# Patient Record
Sex: FEMALE | Race: WHITE | NOT HISPANIC OR LATINO | Employment: PART TIME | URBAN - METROPOLITAN AREA
[De-identification: names, ages, dates, MRNs, and addresses within clinical notes are randomized per-mention and may not be internally consistent; named-entity substitution may affect disease eponyms.]

---

## 2024-02-16 ENCOUNTER — OFFICE VISIT (OUTPATIENT)
Dept: ORTHOPEDIC SURGERY | Facility: CLINIC | Age: 79
End: 2024-02-16
Payer: MEDICARE

## 2024-02-16 DIAGNOSIS — M79.672 LEFT FOOT PAIN: ICD-10-CM

## 2024-02-16 DIAGNOSIS — M19.072 ARTHRITIS OF LEFT MIDFOOT: Primary | ICD-10-CM

## 2024-02-16 PROCEDURE — 20604 DRAIN/INJ JOINT/BURSA W/US: CPT | Performed by: EMERGENCY MEDICINE

## 2024-02-16 PROCEDURE — 99213 OFFICE O/P EST LOW 20 MIN: CPT | Performed by: EMERGENCY MEDICINE

## 2024-02-16 RX ORDER — TRIAMCINOLONE ACETONIDE 40 MG/ML
40 INJECTION, SUSPENSION INTRA-ARTICULAR; INTRAMUSCULAR
Status: COMPLETED | OUTPATIENT
Start: 2024-02-16 | End: 2024-02-16

## 2024-02-16 RX ORDER — LIDOCAINE HYDROCHLORIDE 10 MG/ML
0.5 INJECTION INFILTRATION; PERINEURAL
Status: COMPLETED | OUTPATIENT
Start: 2024-02-16 | End: 2024-02-16

## 2024-02-16 RX ADMIN — TRIAMCINOLONE ACETONIDE 40 MG: 40 INJECTION, SUSPENSION INTRA-ARTICULAR; INTRAMUSCULAR at 16:25

## 2024-02-16 RX ADMIN — LIDOCAINE HYDROCHLORIDE 0.5 ML: 10 INJECTION INFILTRATION; PERINEURAL at 16:25

## 2024-02-16 NOTE — PROGRESS NOTES
Subjective   Priyanka Leblanc is a 78 y.o. female who presents for Follow-up and Pain of the Left Foot    HPI    2/16/24: Patient returns today for reevaluation for left foot pain.  She has known left midfoot arthritis.  We did perform a second TMT joint ultrasound-guided corticosteroid injection on 4/6/2023.  She felt that this did work quite well for her.  However, her pain has since returned and she would like to have a repeat injection performed today.  No additional complaints.    4/6/23: Patient is a 77-year-old female presenting for evaluation of left foot pain. According to patient, pain has been ongoing for the last 1 to 2 years. She denies any trauma or injury prior to pain onset. She describes her pain as being localized to base of the first and second metatarsal. It is described as aching/stiff quality is exacerbated with weightbearing, and worse as the day goes on. She has tried Voltaren, Tylenol, and Salonpas with some relief. She has mild paresthesias at the tips of her toes but no localized numbness/tingling.          ROS: All pertinent positive symptoms are included in the history of present illness.    All other systems have been reviewed and are negative and noncontributory to this patient's current ailments.    Objective     There were no vitals filed for this visit.    Physical Exam  General/Constitutional: No apparent distress. Well-nourished and well developed.  Head: Normocephalic, Atraumatic.   Eyes: EOMI.  Vascular: No edema, swelling or tenderness, except as noted in detailed exam.  Respiratory: Non-labored breathing.  Integumentary: No impressive skin lesions present, except as noted in detailed exam.  Neurological: Alert and oriented.  Psychological:  Normal mood and affect.  Musculoskeletal: Normal, except as noted in detailed exam.  Musculoskeletal: Left foot-there is no deformity or asymmetry when compared to the opposite side. There is tenderness to palpation at the base of the 1st  and 2nd MT as well as medial cuneiform. She has FROM and strength is intact. She has pes planus of bilateral, left greater than right, foot . The remainder of the foot and ankle exam is unremarkable. He has an antalgic gait secondary to pain.       FOOT    - Impression -  1. Moderate midfoot degenerative changes involving the 2nd and 3rd  TMT joints and naviculocuneiform joint.  2. Mild pes planus.    Patient ID: Priyanka Leblanc is a 78 y.o. female.    S Inj/Asp on 2/16/2024 4:25 PM  Details: 25 G needle, ultrasound-guided medial approach  Medications: 40 mg triamcinolone acetonide 40 mg/mL; 0.5 mL lidocaine 10 mg/mL (1 %)  Outcome: tolerated well, no immediate complications  Procedure, treatment alternatives, risks and benefits explained, specific risks discussed. Consent was given by the patient. Immediately prior to procedure a time out was called to verify the correct patient, procedure, equipment, support staff and site/side marked as required. Patient was prepped and draped in the usual sterile fashion.           Assessment/Plan   Problem List Items Addressed This Visit    None  Visit Diagnoses       Arthritis of left midfoot    -  Primary    Left foot pain        Relevant Orders    Point of Care Ultrasound (Completed)          Considering that she did well with previous injection, I feel that a repeat injection is warranted.  Discussed risks versus benefits of having injection performed. Patient has elected to proceed with procedure. Please refer to procedure note above. Discussed with patient to limit weightbearing activities over the next 24-48 hours, they can then progress to full activities as tolerated. Discussed with patient to avoid water submersion over the next 2 days. Discussed with patient to call me immediately if they develop worsening pain, rash, erythema, or fevers. Patient should follow-up as needed if pain persists or worsens.  In the meantime, I did discuss with patient that she would likely  benefit from arch supports.  She will obtain these as well.    Paulie Olsen, DO  Sports Medicine  [facility], [facility] [place]    ** Please excuse any errors in grammar or translation related to this dictation. Voice recognition software was utilized to prepare this document. **

## 2024-03-11 ENCOUNTER — APPOINTMENT (OUTPATIENT)
Dept: ORTHOPEDIC SURGERY | Facility: HOSPITAL | Age: 79
End: 2024-03-11
Payer: MEDICARE

## 2024-04-22 ENCOUNTER — HOSPITAL ENCOUNTER (OUTPATIENT)
Dept: RADIOLOGY | Facility: CLINIC | Age: 79
Discharge: HOME | End: 2024-04-22
Payer: MEDICARE

## 2024-04-22 VITALS — WEIGHT: 156.09 LBS | BODY MASS INDEX: 26.65 KG/M2 | HEIGHT: 64 IN

## 2024-04-22 DIAGNOSIS — Z12.31 ENCOUNTER FOR SCREENING MAMMOGRAM FOR MALIGNANT NEOPLASM OF BREAST: ICD-10-CM

## 2024-04-22 PROCEDURE — 77067 SCR MAMMO BI INCL CAD: CPT | Performed by: RADIOLOGY

## 2024-04-22 PROCEDURE — 77067 SCR MAMMO BI INCL CAD: CPT

## 2024-04-22 PROCEDURE — 77063 BREAST TOMOSYNTHESIS BI: CPT | Performed by: RADIOLOGY

## 2024-09-12 ENCOUNTER — APPOINTMENT (OUTPATIENT)
Dept: GASTROENTEROLOGY | Facility: CLINIC | Age: 79
End: 2024-09-12
Payer: MEDICARE

## 2024-09-27 ENCOUNTER — OFFICE VISIT (OUTPATIENT)
Dept: GASTROENTEROLOGY | Facility: CLINIC | Age: 79
End: 2024-09-27
Payer: MEDICARE

## 2024-09-27 VITALS — HEART RATE: 76 BPM | BODY MASS INDEX: 25.27 KG/M2 | HEIGHT: 64 IN | WEIGHT: 148 LBS

## 2024-09-27 DIAGNOSIS — K21.9 GASTROESOPHAGEAL REFLUX DISEASE, UNSPECIFIED WHETHER ESOPHAGITIS PRESENT: Primary | ICD-10-CM

## 2024-09-27 DIAGNOSIS — K44.9 HIATAL HERNIA: ICD-10-CM

## 2024-09-27 PROCEDURE — 99204 OFFICE O/P NEW MOD 45 MIN: CPT | Performed by: INTERNAL MEDICINE

## 2024-09-27 PROCEDURE — 1036F TOBACCO NON-USER: CPT | Performed by: INTERNAL MEDICINE

## 2024-09-27 PROCEDURE — 1159F MED LIST DOCD IN RCRD: CPT | Performed by: INTERNAL MEDICINE

## 2024-09-27 PROCEDURE — 1160F RVW MEDS BY RX/DR IN RCRD: CPT | Performed by: INTERNAL MEDICINE

## 2024-09-27 RX ORDER — PANTOPRAZOLE SODIUM 40 MG/1
40 TABLET, DELAYED RELEASE ORAL
COMMUNITY
Start: 2024-09-12

## 2024-09-27 RX ORDER — MULTIVIT WITH MINERALS/HERBS
1 TABLET ORAL DAILY
COMMUNITY

## 2024-09-27 RX ORDER — MULTIVIT-MIN/IRON/FOLIC ACID/K 18-600-40
CAPSULE ORAL
COMMUNITY

## 2024-09-27 RX ORDER — ACETAMINOPHEN 500 MG
1 TABLET ORAL DAILY
COMMUNITY

## 2024-09-27 RX ORDER — FLUTICASONE PROPIONATE 50 MCG
1 SPRAY, SUSPENSION (ML) NASAL
COMMUNITY
Start: 2021-05-05

## 2024-09-27 RX ORDER — DEXTROMETHORPHAN HYDROBROMIDE, GUAIFENESIN 5; 100 MG/5ML; MG/5ML
1300 LIQUID ORAL EVERY 8 HOURS PRN
COMMUNITY

## 2024-09-27 RX ORDER — CONJUGATED ESTROGENS 0.62 MG/G
CREAM VAGINAL
COMMUNITY

## 2024-09-27 RX ORDER — ELECTROLYTES/DEXTROSE
SOLUTION, ORAL ORAL
COMMUNITY

## 2024-09-27 RX ORDER — POLYETHYLENE GLYCOL 3350 17 G/17G
POWDER, FOR SOLUTION ORAL
COMMUNITY
Start: 2020-08-27

## 2024-09-27 RX ORDER — AZELASTINE 1 MG/ML
SPRAY, METERED NASAL
COMMUNITY
Start: 2021-05-05

## 2024-09-27 RX ORDER — LEVOTHYROXINE SODIUM 100 UG/1
TABLET ORAL
COMMUNITY
Start: 2014-09-22

## 2024-09-27 RX ORDER — AMLODIPINE BESYLATE 10 MG/1
5 TABLET ORAL
COMMUNITY
Start: 2014-12-09 | End: 2025-03-17

## 2024-09-27 RX ORDER — LANOLIN ALCOHOL/MO/W.PET/CERES
1000 CREAM (GRAM) TOPICAL
COMMUNITY

## 2024-09-27 ASSESSMENT — ENCOUNTER SYMPTOMS
DEPRESSION: 0
OCCASIONAL FEELINGS OF UNSTEADINESS: 0
LOSS OF SENSATION IN FEET: 0

## 2024-09-27 NOTE — PROGRESS NOTES
REASON FOR VISIT: Discuss heartburn symptoms    HPI:  Priyanka Leblanc is a 79 y.o. female seen in the past for GERD.  Reports was doing well before months ago started experiencing severe heartburn symptoms.  Tried initially famotidine daily without response.  Placed then by her primary physician on pantoprazole 40 mg daily over the past few weeks without response.  Describes heartburn symptoms throughout the day and night.  Has known history of small hiatal hernia with last upper endoscopy almost 5 years ago.  No esophagitis then.  No significant weight gain over the past several years.  No dysphagia.  Feels the burning go up all the way up the chest.  Nonexertional.  Has been under stress as her   within the past year.        PRIOR ENDOSCOPY    PAST MEDICAL HISTORY  Past Medical History:   Diagnosis Date    Malignant neoplasm of unspecified site of left female breast (Multi) 2016    Breast cancer, left    Personal history of irradiation     Personal history of other diseases of the circulatory system     History of hypertension    Personal history of other diseases of the digestive system     History of colitis    Personal history of other diseases of the digestive system     History of gastroesophageal reflux (GERD)    Personal history of other diseases of the musculoskeletal system and connective tissue     History of arthritis    Personal history of other endocrine, nutritional and metabolic disease     History of hypothyroidism    Personal history of other specified conditions     History of vertigo       PAST SURGICAL HISTORY  Past Surgical History:   Procedure Laterality Date    BREAST BIOPSY Bilateral     BREAST LUMPECTOMY Left 2018    Left Breast Lumpectomy    CATARACT EXTRACTION  2018    Cataract Surgery    HEMORRHOID SURGERY  2016    Hemorrhoidectomy    MR HEAD ANGIO WO IV CONTRAST  2021    MR HEAD ANGIO WO IV CONTRAST [facility]     NECK  ANGIO WO IV CONTRAST  02/25/2021    MR NECK ANGIO WO IV CONTRAST 2/25/2021 [facility]    OTHER SURGICAL HISTORY  01/28/2016    Reported Hx Of Hip Replacement - Bilateral    OTHER SURGICAL HISTORY  02/12/2019    Shoulder replacement    TOTAL ABDOMINAL HYSTERECTOMY W/ BILATERAL SALPINGOOPHORECTOMY  01/28/2016    Total Abdominal Hysterectomy With Removal Of Both Ovaries       FAMILY HISTORY  Family History   Problem Relation Name Age of Onset    Breast cancer Sister         SOCIAL HISTORY  Social History     Tobacco Use    Smoking status: Never    Smokeless tobacco: Never   Substance Use Topics    Alcohol use: Never       REVIEW OF SYSTEMS  CONSTITUTIONAL: negative for fever, chills, fatigue, or unintentional weight loss,   HEENT: negative for icteric sclera, eye pain/redness, or changes in vision/hearing  RESPIRATORY: negative for cough, hemoptysis, wheezing, orthopnea, or dyspnea on exertion  CARDIOVASCULAR: negative for chest pain, palpitations, or syncope   GASTROINTESTINAL: as noted per HPI  GENITOURINARY: negative for dysuria, polyuria, incontinence, or hematuria  MUSCULOSKELETAL: negative for arthralgia, myalgia, or joint swelling/stiffness   INTEGUMENTARY/SKIN: negative jaundice, rash, or skin lesion  HEMATOLOGIC/LYMPHATIC: negative for prolonged bleeding, easy bruising, or swollen lymph nodes  ENDOCRINE: negative for cold/heat intolerance, polydipsia, polyuria, or goiter  NEUROLOGIC: negative for headaches, dizziness, tremor, or gait abnormality  PSYCHIATRIC: negative for anxiety, depression, personality changes, or sleep disturbances      A 10 point review of systems was completed and was otherwise negative.    ALLERGIES  Allergies   Allergen Reactions    Estradiol Unknown     Leg weakness, SOB    Gatifloxacin Unknown and GI Upset    Nitrofurantoin Monohyd/M-Cryst Itching and Unknown     palpitation    Latex Rash and Unknown    Levofloxacin Unknown    Omeprazole Hives     not effective  "      MEDICATIONS  Current Outpatient Medications   Medication Sig Dispense Refill    acetaminophen (Tylenol 8 HOUR) 650 mg ER tablet Take 2 tablets (1,300 mg) by mouth every 8 hours if needed.      amLODIPine (Norvasc) 10 mg tablet Take 0.5 tablets (5 mg) by mouth once daily.      ascorbic acid, vitamin C, 500 mg capsule Take by mouth.      azelastine (Astelin) 137 mcg (0.1 %) nasal spray Spray 2 sprays twice a day by intranasal route as needed.      biotin 5 mg capsule Take by mouth.      cholecalciferol (Vitamin D-3) 5,000 Units tablet Take 1 tablet (5,000 Units) by mouth once daily.      cyanocobalamin (Vitamin B-12) 1,000 mcg tablet Take 1 tablet (1,000 mcg) by mouth once daily.      fluticasone (Flonase) 50 mcg/actuation nasal spray 1 spray by Does not apply route daily at bedtime.      levothyroxine (Synthroid, Levoxyl) 100 mcg tablet TAKE ONE TABLET BY MOUTH DAILY FOR 6 DAYS A WEEK AND HALF A TABLET FOR 1 DAY A WEEK      pantoprazole (ProtoNix) 40 mg EC tablet Take 1 tablet (40 mg) by mouth once daily.      polyethylene glycol (Miralax) 17 gram/dose powder Mix of powder and drink.      Premarin vaginal cream INSERT 0.5GRAM INTRAVAGINALLY TWO NIGHTS PER WEEK      vitamin B complex (B Complex-Vitamin B12) tablet Take 1 tablet by mouth once daily.       No current facility-administered medications for this visit.       VITALS  Pulse 76   Ht 1.626 m (5' 4\")   Wt 67.1 kg (148 lb)   BMI 25.40 kg/m²      PHYSICAL EXAM  Alert and oriented in no acute distress    ASSESSMENT/ PLAN  Patient with history of GERD and small hiatal hernia with persistent heartburn symptoms.  Nonexertional.  Unresponsive to both famotidine and pantoprazole therapy.  Given the symptoms recommended upper endoscopy to rule out underlying esophagitis including Candida esophagitis.  Discussed possible bile reflux causing symptoms.  Also possible functional heartburn.  If endoscopically normal may consider trial of low-dose desipramine for " possible functional heartburn secondary to her increased stress.  She is in agreement with the plan.        Signature: Tika Young MD    Date: 9/27/2024  Time: 2:59 PM

## 2024-09-30 ENCOUNTER — OFFICE VISIT (OUTPATIENT)
Dept: GASTROENTEROLOGY | Facility: EXTERNAL LOCATION | Age: 79
End: 2024-09-30
Payer: MEDICARE

## 2024-09-30 ENCOUNTER — APPOINTMENT (OUTPATIENT)
Dept: GASTROENTEROLOGY | Facility: CLINIC | Age: 79
End: 2024-09-30
Payer: MEDICARE

## 2024-09-30 DIAGNOSIS — K44.9 HIATAL HERNIA: Primary | ICD-10-CM

## 2024-09-30 DIAGNOSIS — K21.9 GASTROESOPHAGEAL REFLUX DISEASE, UNSPECIFIED WHETHER ESOPHAGITIS PRESENT: ICD-10-CM

## 2024-09-30 PROCEDURE — 43239 EGD BIOPSY SINGLE/MULTIPLE: CPT | Performed by: INTERNAL MEDICINE

## 2024-09-30 PROCEDURE — 88305 TISSUE EXAM BY PATHOLOGIST: CPT

## 2024-09-30 PROCEDURE — 88305 TISSUE EXAM BY PATHOLOGIST: CPT | Performed by: STUDENT IN AN ORGANIZED HEALTH CARE EDUCATION/TRAINING PROGRAM

## 2024-10-01 ENCOUNTER — LAB REQUISITION (OUTPATIENT)
Dept: LAB | Facility: HOSPITAL | Age: 79
End: 2024-10-01
Payer: MEDICARE

## 2024-10-09 ENCOUNTER — APPOINTMENT (OUTPATIENT)
Dept: GASTROENTEROLOGY | Facility: CLINIC | Age: 79
End: 2024-10-09
Payer: MEDICARE

## 2024-10-11 LAB
CLINICAL SIG UPDATED INFO: NORMAL
LABORATORY COMMENT REPORT: NORMAL
PATH REPORT.FINAL DX SPEC: NORMAL
PATH REPORT.GROSS SPEC: NORMAL
PATH REPORT.RELEVANT HX SPEC: NORMAL
PATH REPORT.TOTAL CANCER: NORMAL

## 2024-10-11 NOTE — RESULT ENCOUNTER NOTE
The biopsies that were performed in your esophagus were normal.  There was no evidence of eosinophilic esophagitis.  The recommendation is to follow-up in the office as needed.      Tika Young MD

## 2024-10-21 ENCOUNTER — APPOINTMENT (OUTPATIENT)
Dept: OTOLARYNGOLOGY | Facility: CLINIC | Age: 79
End: 2024-10-21
Payer: MEDICARE

## 2024-10-21 VITALS — BODY MASS INDEX: 26.63 KG/M2 | HEIGHT: 64 IN | WEIGHT: 156 LBS | TEMPERATURE: 98.6 F

## 2024-10-21 DIAGNOSIS — M26.609 TEMPOROMANDIBULAR JOINT DISORDER: Primary | ICD-10-CM

## 2024-10-21 PROCEDURE — 99203 OFFICE O/P NEW LOW 30 MIN: CPT | Performed by: OTOLARYNGOLOGY

## 2024-10-21 PROCEDURE — 1159F MED LIST DOCD IN RCRD: CPT | Performed by: OTOLARYNGOLOGY

## 2024-10-21 PROCEDURE — 31231 NASAL ENDOSCOPY DX: CPT | Performed by: OTOLARYNGOLOGY

## 2024-10-21 PROCEDURE — 1160F RVW MEDS BY RX/DR IN RCRD: CPT | Performed by: OTOLARYNGOLOGY

## 2024-10-21 ASSESSMENT — PATIENT HEALTH QUESTIONNAIRE - PHQ9
2. FEELING DOWN, DEPRESSED OR HOPELESS: NOT AT ALL
1. LITTLE INTEREST OR PLEASURE IN DOING THINGS: NOT AT ALL
SUM OF ALL RESPONSES TO PHQ9 QUESTIONS 1 AND 2: 0

## 2024-10-27 PROBLEM — M26.609 TEMPOROMANDIBULAR JOINT DISORDER: Status: ACTIVE | Noted: 2024-10-27

## 2024-10-27 ASSESSMENT — ENCOUNTER SYMPTOMS
ADENOPATHY: 0
TROUBLE SWALLOWING: 0
APPETITE CHANGE: 0
SORE THROAT: 0
NECK PAIN: 0
NAUSEA: 0
FACIAL SWELLING: 0
COUGH: 0
UNEXPECTED WEIGHT CHANGE: 0
VOICE CHANGE: 0
CHILLS: 0
SHORTNESS OF BREATH: 0
VOMITING: 0
STRIDOR: 0
FEVER: 0
FATIGUE: 1

## 2024-10-28 NOTE — ASSESSMENT & PLAN NOTE
Mild jaw discomfort with no evidence of acute sinusitis  Eye burning is separate from any evidence of acute sinus pathology  Imaging from 12/23 is negative (sinuses were clear)  Nasal endoscopy today is normal  No evidence of inflammation or infection/no polyps  Reassurance provided  Follow up as needed

## 2024-10-28 NOTE — PROGRESS NOTES
Subjective   Patient ID: Priyanka Leblanc is a 79 y.o. female who presents for Sinus Problem.  She is here today for evaluation of her jaw/sinuses.  She has been seeing GI for evaluation of GERD.  She was having severe chest burning and was diagnosed with GERD and a small hiatal hernia.  She was started on protonix.   In addition to the chest symptoms she was having jaw soreness as well as burning around her eyes.  She has also had tinnitus.  +fatigue.  She was seen by GI and had EGD.  The EGD was negative.  She was told that she can stop her protonix.  She was referred to me for her jaw soreness and burning around her eyes.  Not affected by diet.  Denies rhinorrhea.  No nasal congestion.  Has tried flonase without improvement.  No eye swelling.      HPI    Review of Systems   Constitutional:  Positive for fatigue. Negative for appetite change, chills, fever and unexpected weight change.   HENT:  Positive for tinnitus. Negative for dental problem, drooling, ear pain, facial swelling, hearing loss, mouth sores, sore throat, trouble swallowing and voice change.         +burning around eyes   Respiratory:  Negative for cough, shortness of breath and stridor.    Gastrointestinal:  Negative for nausea and vomiting.   Musculoskeletal:  Negative for neck pain.   Hematological:  Negative for adenopathy.       Objective   Physical Exam  HENT:      Head: Normocephalic and atraumatic.      Right Ear: Tympanic membrane and ear canal normal.      Left Ear: Tympanic membrane and ear canal normal.      Nose: Nose normal.      Comments: Turbinates not swollen, no purulence.  No polyps     Mouth/Throat:      Mouth: Mucous membranes are moist.   Eyes:      Extraocular Movements: Extraocular movements intact.      Conjunctiva/sclera: Conjunctivae normal.      Pupils: Pupils are equal, round, and reactive to light.   Musculoskeletal:      Cervical back: Normal range of motion.   Skin:     General: Skin is warm and dry.   Neurological:       Mental Status: She is alert and oriented to person, place, and time.   Psychiatric:         Attention and Perception: Attention normal.         Mood and Affect: Mood normal.         Speech: Speech normal.         Behavior: Behavior normal. Behavior is cooperative.       Procedure:  PROCEDURE NOTE:  Recommended bilateral nasal endoscopy.  Risks, benefits, personnel and alternatives were explained.  The patient wished to proceed.  S/he was re-identified.  PROCEDURE:  Bilateral nasal endoscopy  PREOPERATIVE DIAGNOSIS: Eye burning, sinus pressure  POSTOPERATIVE DIAGNOSIS: Same as above, no sinus polyps, no purulence, no acute sinusitis   INDICATIONS: Evaluate for sinusitis  ANESTHESIA: 4% lidocaine and 0.5% phenylephrine  PROCEDURE:  With the patient sitting upright topical anesthesia and vasoconstriction was applied with spray to the right and left sides of the nose.  After waiting an appropriate period of time for anesthesia/vasoconstriction to become effective, a flexible nasal endoscope was passed through the right and left sides of the nose.  Bilateral nares and nasopharynx were examined.  FINDINGS:  The bilateral middle meati are open/patent without purulence.  No nasal polyps.  Bilaterally patent.  Nasopharynx was normal without masses.  No evidence of acute sinusitis.  Bilaterally patent eustachian tube orifice.  Patient tolerated the procedure well, and there were no complications.     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

## 2024-11-11 ENCOUNTER — CLINICAL SUPPORT (OUTPATIENT)
Dept: AUDIOLOGY | Facility: CLINIC | Age: 79
End: 2024-11-11
Payer: MEDICARE

## 2024-11-11 DIAGNOSIS — H93.13 SUBJECTIVE TINNITUS OF BOTH EARS: ICD-10-CM

## 2024-11-11 DIAGNOSIS — H90.3 ASYMMETRICAL SENSORINEURAL HEARING LOSS: Primary | ICD-10-CM

## 2024-11-11 PROCEDURE — 92567 TYMPANOMETRY: CPT

## 2024-11-11 PROCEDURE — 92557 COMPREHENSIVE HEARING TEST: CPT

## 2024-11-11 NOTE — PROGRESS NOTES
AUDIOLOGY ADULT AUDIOMETRIC EVALUATION     Name: Priyanka Leblanc   : 1945 Age: 79 y.o.   Date of Evaluation: 2024 Time: [id number]     IMPRESSIONS   Hearing sensitivity within normal limits sloping to mild to moderately-severe sensorineural hearing loss in both ears.  Asymmetry between ears, left ear worse.   Word understanding in quiet is excellent in the right ear, and good in the left ear. Word understanding in noise is consistent with a moderate SNR loss in the right ear, and a severe SNR loss in the left ear, and a moderate SNR loss with both ears together. With a moderate SNR loss (7-15 dB), directional microphones help; consider array gt.  Asymmetry between ears, left ear worse.   Tympanometry indicates middle ear pressure and eardrum compliance within normal limits in the right ear, and normal middle ear pressure and reduced eardrum compliance in the left ear.   Amplification needs: Hearing aids were discussed as a management option for hearing loss pending medical clearance. Patient was provided with insurance verification worksheet, hearing aid candidacy packet, and a copy of their audiogram.    Today's test results are hearing loss requiring medical/otologic and audiologic follow-up.     RECOMMENDATIONS   Continue medical follow up with primary care provider and/or Ears Nose and Throat (ENT) provider as recommended.  Schedule an evaluation with an Ears Nose and Throat (ENT) provider to address asymmetry between ears (threshold and word recognition). For ENT scheduling, call [phone].   Return for audiologic evaluation in conjunction with medical management or annually (whichever is sooner) to monitor hearing sensitivity and assess middle ear status or sooner should concerns arise. The audiology department can be reached at [phone] to schedule an appointment.   Consider amplification pending medical clearance. Check insurance benefit for hearing aid benefits and in-network  providers. To schedule a hearing aid consultation with [facility] audiology department, call [phone]. Patient was provided with insurance verification worksheet, hearing aid candidacy packet, and a copy of today's audiogram.  Avoid exposure to loud sounds by moving away from the noise, turning down the volume, or wearing proper hearing protection correctly.  Consider use of good communication strategies. These include but are not limited to the following: get Priyanka's attention before speaking to her, close the distance between Priyanka and who is speaking, limit background noise, allow Priyanka access to visual cues (i.e. facial expressions/mouth movements, pictures, written instructions, etc.). When in situations where background noise cannot be avoided, position yourself so that the background noise is to your back, and you communication partner is seated in front of you, ideally with a quiet area or wall behind them.   Consider use of tinnitus management options, which include but are not limited to the following: sound therapy (use of pleasant or calming sounds that diminish the presence of tinnitus); mindfulness, meditation, and breathing exercises; sleep hygiene; mental health evaluation and formal therapies; psychiatric management of psychopharmaceuticals; dental/orthodontia evaluation; dietary changes (reduction of sodium, caffeine, alcohol); lifestyle changes (exercise, etc.); use of hearing protection and avoidance of loud noise; and formal tinnitus therapies (Tinnitus Retraining Therapy/ TRT, Progressive Tinnitus Management, Tinnitus Activities Treatment, Cognitive Behavioral Therapy).    HISTORY    History obtained from patient report and chart review; please see medical records for complete history. Reason for visit: Ms. Leblanc was seen today for an initial audiologic evaluation at the request of Erin Tellez MD due to concerns for change in hearing in both ears.    Change in Hearing:  "yes in both ears  Difficult listening environments: Noisy situations, misses out on what people are saying with multiple talkers   Tinnitus: yes in both ears; constant, bothersome, non-pulsatile, and described as buzzing sensation, described as what a furnace sounds like. Does not prevent her from sleeping or concentrating. Gets louder at times.   Otalgia: denied  Aural Pressure/Fullness: denied  Recent Ear Infections/Otorrhea: denied  Dizziness: denied  History of Ear Surgeries: denied  History of Noise Exposure: denied  Hearing Aid Use: None  Family History of Hearing Loss: Yes, parents with aging   Falls within the last year: denied  Other Significant History: denied    EVALUATION AND PATIENT EDUCATION   The following is a brief interpretation of the obtained findings from the audiologic evaluation. Discussed results and recommendations with Ms. Leblanc. Questions were addressed and the patient was encouraged to contact our department at [phone] should concerns arise.     TEST RESULTS - See scanned audiogram in \"Media.\"   Otoscopic Evaluation:   Right Ear: Ear canal clear, tympanic membrane visualized.   Left Ear: Ear canal clear, tympanic membrane visualized.       Tympanometry (226 Hz): An objective evaluation of middle ear function. CPT code: 57507   Right Ear: Type A, middle ear pressure and tympanic membrane compliance within normal limits.   Left Ear: Type As, normal middle ear pressure and reduced tympanic membrane compliance.       Acoustic Reflexes: An objective measure of auditory and facial nerve pathways.   (Probe) Right Ear (ipsi right stimulus ear; contralateral left stimulus ear):   (Ipsilateral) Responses absent at 500-2000 Hz.   (Probe) Left Ear (ipsi left stimulus ear; contralateral right stimulus ear):   (Ipsilateral) Responses present at reduced sensation levels for 500-2000 Hz.       Test technique: Conventional Audiometry via insert earphones.   An evaluation of hearing sensitivity via " air and bone conduction and speech recognition. CPT code: 02838   Reliability: good       Pure Tone Audiometry:    Right Ear: Hearing sensitivity within normal limits for 125-500 Hz, sloping to mild sensorineural hearing loss for 750-2000 Hz, to severe through 8000 Hz.   Left Ear: Hearing sensitivity within normal limits for 125-500 Hz, sloping to moderate to moderately-severe for 4005-8617 Hz.  Asymmetry between ears, left ear worse.        Speech Audiometry:    Right Ear: Speech Reception Threshold (SRT) was obtained at 25 dB HL. This is in good agreement with three frequency Pure Tone Average (PTA).  Word Recognition scores in quiet were excellent (100%) when words were presented at 70 dB HL. These results are based on [facility] Auditory Test No.6 (NU-6) Ordered by difficulty (N=10).  Quick-SIN tested was administered at 70 dB HL, and testing revealed a signal to noise ratio (SNR) loss of 7.5, which is categorized as a moderate SNR loss (7-15 dB), directional microphones help; consider array gt.   Left Ear: Speech Reception Threshold (SRT) was obtained at 30 dB HL. This is in good agreement with three frequency Pure Tone Average (PTA).  Word Recognition scores in quiet were very poor (40%) when words were presented at 70 dB HL. These results are based on [facility] Auditory Test No.6 (NU-6) Ordered by difficulty (N=25).  Word Recognition scores in quiet were good (80%) when words were presented at 80 dB HL. These results are based on [facility] Auditory Test No.6 (NU-6) Ordered by difficulty (N=25).  Quick-SIN tested was administered at 85 dB HL, and testing revealed a signal to noise ratio (SNR) loss of 16.5, which is categorized as a severe SNR loss (>15 dB), maximum SNR improvement is needed; consider an FM system.   Binaural: Quick-SIN tested was administered at 70 dB HL, and testing revealed a signal to noise ratio (SNR) loss of 8.0 which is categorized as a moderate  SNR loss (7-15 dB), directional microphones help; consider array gt.  Asymmetry between ears, left ear worse.        Comparison to previous results: No previous results available.          Mya Velasquez, AUD, CCC-A  Licensed Clinical Audiologist    Degree of Hearing Decibel Range  Key   Within Normal Limits 0-20  CNT Could Not Test   Slight 21-25  DNT Did Not Test   Mild 26-40  ECV Ear Canal Volume   Moderate 41-55  OAE Otoacoustic Emissions   Moderately-Severe 56-70  SIN Speech in Noise   Severe 71-90  TM Tympanic Membrane   Profound 91+  HA Hearing Aid   Sensorineural Hearing Loss SNHL  MLV Monitored Live Voice   Conductive Hearing Loss CHL  WNL Within Normal Limits   Noise-Induced Hearing Loss NIHL

## 2024-11-11 NOTE — LETTER
2024     Evan Wiley MD  [id number] [address] [facility] [place] [address]    Patient: Priyanka Leblanc   YOB: 1945   Date of Visit: 2024       Dear Dr. Evan Wiley MD:    I saw this patient for a hearing test and recommended hearing aids. I also recommended a referral to ENT for asymmetric hearing loss. She was referred to me by Erin Tellez MD who is an ENT but does not specialize in ears/hearing. I told Ms. Leblanc that my recommendation was for her to be seen by a ENT due to asymmetric hearing loss. She expressed some confusion when I explained to her that Dr. Tellez may recommend that she be seen by one of her colleagues as she does not specialize in that aspect of otolaryngology.     She did express interest in hearing aids, but mentioned that she may wait until the next year to pursue them as she is planing to change insurance plans. We discussed that she will likely need medical clearance for hearing aids due to asymmetry between ears and was provided with a medical clearance form to be signed by a physician.     If you could place a referral for ENT, that would be much appreciated.     Please let me know if you have any questions or need additional information!        Sincerely,     Mya Velasquez, MELO, CCC-A      CC: No Recipients  ______________________________________________________________________________________    AUDIOLOGY ADULT AUDIOMETRIC EVALUATION     Name: Priyanka Leblanc   : 1945 Age: 79 y.o.   Date of Evaluation: 2024 Time: 0163-5339     IMPRESSIONS   Hearing sensitivity within normal limits sloping to mild to moderately-severe sensorineural hearing loss in both ears.  Asymmetry between ears, left ear worse.   Word understanding in quiet is excellent in the right ear, and good in the left ear. Word understanding in noise is consistent with a moderate SNR loss in the right ear, and a severe SNR loss in the left ear, and a moderate SNR loss with  both ears together. With a moderate SNR loss (7-15 dB), directional microphones help; consider array gt.  Asymmetry between ears, left ear worse.   Tympanometry indicates middle ear pressure and eardrum compliance within normal limits in the right ear, and normal middle ear pressure and reduced eardrum compliance in the left ear.   Amplification needs: Hearing aids were discussed as a management option for hearing loss pending medical clearance. Patient was provided with insurance verification worksheet, hearing aid candidacy packet, and a copy of their audiogram.    Today's test results are hearing loss requiring medical/otologic and audiologic follow-up.     RECOMMENDATIONS   Continue medical follow up with primary care provider and/or Ears Nose and Throat (ENT) provider as recommended.  Schedule an evaluation with an Ears Nose and Throat (ENT) provider to address asymmetry between ears (threshold and word recognition). For ENT scheduling, call [phone].   Return for audiologic evaluation in conjunction with medical management or annually (whichever is sooner) to monitor hearing sensitivity and assess middle ear status or sooner should concerns arise. The audiology department can be reached at [phone] to schedule an appointment.   Consider amplification pending medical clearance. Check insurance benefit for hearing aid benefits and in-network providers. To schedule a hearing aid consultation with [facility] audiology department, call [phone]. Patient was provided with insurance verification worksheet, hearing aid candidacy packet, and a copy of today's audiogram.  Avoid exposure to loud sounds by moving away from the noise, turning down the volume, or wearing proper hearing protection correctly.  Consider use of good communication strategies. These include but are not limited to the following: get Priyanka's attention before speaking to her, close the distance between Priyanka and who is  speaking, limit background noise, allow Priyanka access to visual cues (i.e. facial expressions/mouth movements, pictures, written instructions, etc.). When in situations where background noise cannot be avoided, position yourself so that the background noise is to your back, and you communication partner is seated in front of you, ideally with a quiet area or wall behind them.   Consider use of tinnitus management options, which include but are not limited to the following: sound therapy (use of pleasant or calming sounds that diminish the presence of tinnitus); mindfulness, meditation, and breathing exercises; sleep hygiene; mental health evaluation and formal therapies; psychiatric management of psychopharmaceuticals; dental/orthodontia evaluation; dietary changes (reduction of sodium, caffeine, alcohol); lifestyle changes (exercise, etc.); use of hearing protection and avoidance of loud noise; and formal tinnitus therapies (Tinnitus Retraining Therapy/ TRT, Progressive Tinnitus Management, Tinnitus Activities Treatment, Cognitive Behavioral Therapy).    HISTORY    History obtained from patient report and chart review; please see medical records for complete history. Reason for visit: Ms. Leblanc was seen today for an initial audiologic evaluation at the request of Erin Tellez MD due to concerns for change in hearing in both ears.    Change in Hearing: yes in both ears  Difficult listening environments: Noisy situations, misses out on what people are saying with multiple talkers   Tinnitus: yes in both ears; constant, bothersome, non-pulsatile, and described as buzzing sensation, described as what a furnace sounds like. Does not prevent her from sleeping or concentrating. Gets louder at times.   Otalgia: denied  Aural Pressure/Fullness: denied  Recent Ear Infections/Otorrhea: denied  Dizziness: denied  History of Ear Surgeries: denied  History of Noise Exposure: denied  Hearing Aid Use: None  Family History of  "Hearing Loss: Yes, parents with aging   Falls within the last year: denied  Other Significant History: denied    EVALUATION AND PATIENT EDUCATION   The following is a brief interpretation of the obtained findings from the audiologic evaluation. Discussed results and recommendations with Ms. Leblanc. Questions were addressed and the patient was encouraged to contact our department at [phone] should concerns arise.     TEST RESULTS - See scanned audiogram in \"Media.\"   Otoscopic Evaluation:   Right Ear: Ear canal clear, tympanic membrane visualized.   Left Ear: Ear canal clear, tympanic membrane visualized.       Tympanometry (226 Hz): An objective evaluation of middle ear function. CPT code: 55127   Right Ear: Type A, middle ear pressure and tympanic membrane compliance within normal limits.   Left Ear: Type As, normal middle ear pressure and reduced tympanic membrane compliance.       Acoustic Reflexes: An objective measure of auditory and facial nerve pathways.   (Probe) Right Ear (ipsi right stimulus ear; contralateral left stimulus ear):   (Ipsilateral) Responses absent at 500-2000 Hz.   (Probe) Left Ear (ipsi left stimulus ear; contralateral right stimulus ear):   (Ipsilateral) Responses present at reduced sensation levels for 500-2000 Hz.       Test technique: Conventional Audiometry via insert earphones.   An evaluation of hearing sensitivity via air and bone conduction and speech recognition. CPT code: 16631   Reliability: good       Pure Tone Audiometry:    Right Ear: Hearing sensitivity within normal limits for 125-500 Hz, sloping to mild sensorineural hearing loss for 750-2000 Hz, to severe through 8000 Hz.   Left Ear: Hearing sensitivity within normal limits for 125-500 Hz, sloping to moderate to moderately-severe for 5910-6088 Hz.  Asymmetry between ears, left ear worse.        Speech Audiometry:    Right Ear: Speech Reception Threshold (SRT) was obtained at 25 dB HL. This is in good agreement with " three frequency Pure Tone Average (PTA).  Word Recognition scores in quiet were excellent (100%) when words were presented at 70 dB HL. These results are based on [facility] Auditory Test No.6 (NU-6) Ordered by difficulty (N=10).  Quick-SIN tested was administered at 70 dB HL, and testing revealed a signal to noise ratio (SNR) loss of 7.5, which is categorized as a moderate SNR loss (7-15 dB), directional microphones help; consider array gt.   Left Ear: Speech Reception Threshold (SRT) was obtained at 30 dB HL. This is in good agreement with three frequency Pure Tone Average (PTA).  Word Recognition scores in quiet were very poor (40%) when words were presented at 70 dB HL. These results are based on [facility] Auditory Test No.6 (NU-6) Ordered by difficulty (N=25).  Word Recognition scores in quiet were good (80%) when words were presented at 80 dB HL. These results are based on [facility] Auditory Test No.6 (NU-6) Ordered by difficulty (N=25).  Quick-SIN tested was administered at 85 dB HL, and testing revealed a signal to noise ratio (SNR) loss of 16.5, which is categorized as a severe SNR loss (>15 dB), maximum SNR improvement is needed; consider an FM system.   Binaural: Quick-SIN tested was administered at 70 dB HL, and testing revealed a signal to noise ratio (SNR) loss of 8.0 which is categorized as a moderate SNR loss (7-15 dB), directional microphones help; consider array gt.  Asymmetry between ears, left ear worse.        Comparison to previous results: No previous results available.          Mya Velasquez, AUD, CCC-A  Licensed Clinical Audiologist    Degree of Hearing Decibel Range  Key   Within Normal Limits 0-20  CNT Could Not Test   Slight 21-25  DNT Did Not Test   Mild 26-40  ECV Ear Canal Volume   Moderate 41-55  OAE Otoacoustic Emissions   Moderately-Severe 56-70  SIN Speech in Noise   Severe 71-90  TM Tympanic Membrane   Profound 91+  HA Hearing Aid    Sensorineural Hearing Loss SNHL  MLV Monitored Live Voice   Conductive Hearing Loss CHL  WNL Within Normal Limits   Noise-Induced Hearing Loss NIHL

## 2024-11-12 ASSESSMENT — PAIN SCALES - GENERAL: PAINLEVEL_OUTOF10: 0 - NO PAIN

## 2024-11-12 ASSESSMENT — PAIN - FUNCTIONAL ASSESSMENT: PAIN_FUNCTIONAL_ASSESSMENT: 0-10

## 2024-11-25 ENCOUNTER — OFFICE VISIT (OUTPATIENT)
Dept: ORTHOPEDIC SURGERY | Facility: HOSPITAL | Age: 79
End: 2024-11-25
Payer: MEDICARE

## 2024-11-25 ENCOUNTER — HOSPITAL ENCOUNTER (OUTPATIENT)
Dept: RADIOLOGY | Facility: EXTERNAL LOCATION | Age: 79
Discharge: HOME | End: 2024-11-25

## 2024-11-25 DIAGNOSIS — M19.072 ARTHRITIS OF LEFT MIDFOOT: Primary | ICD-10-CM

## 2024-11-25 PROCEDURE — 99213 OFFICE O/P EST LOW 20 MIN: CPT | Performed by: EMERGENCY MEDICINE

## 2024-11-25 PROCEDURE — 2500000004 HC RX 250 GENERAL PHARMACY W/ HCPCS (ALT 636 FOR OP/ED): Performed by: EMERGENCY MEDICINE

## 2024-11-25 RX ORDER — LIDOCAINE HYDROCHLORIDE 10 MG/ML
0.5 INJECTION, SOLUTION INFILTRATION; PERINEURAL
Status: COMPLETED | OUTPATIENT
Start: 2024-11-25 | End: 2024-11-25

## 2024-11-25 RX ORDER — TRIAMCINOLONE ACETONIDE 40 MG/ML
40 INJECTION, SUSPENSION INTRA-ARTICULAR; INTRAMUSCULAR
Status: COMPLETED | OUTPATIENT
Start: 2024-11-25 | End: 2024-11-25

## 2024-11-25 NOTE — PROGRESS NOTES
Subjective   Priyanka Leblanc is a 79 y.o. female who presents for Follow-up of the Left Foot    HPI    11/25/24: Patient returns today for reevaluation for left foot pain.  She has known midfoot arthritis and we did perform a second TMT joint ultrasound-guided corticosteroid injection for her on 2/16/2024.  She felt it worked quite well up until recently.  Her pain is since returned to become progressively worse.  Considering this, she would like a repeat injection performed today.  No additional complaints.    2/16/24: Patient returns today for reevaluation for left foot pain.  She has known left midfoot arthritis.  We did perform a second TMT joint ultrasound-guided corticosteroid injection on 4/6/2023.  She felt that this did work quite well for her.  However, her pain has since returned and she would like to have a repeat injection performed today.  No additional complaints.    4/6/23: Patient is a 77-year-old female presenting for evaluation of left foot pain. According to patient, pain has been ongoing for the last 1 to 2 years. She denies any trauma or injury prior to pain onset. She describes her pain as being localized to base of the first and second metatarsal. It is described as aching/stiff quality is exacerbated with weightbearing, and worse as the day goes on. She has tried Voltaren, Tylenol, and Salonpas with some relief. She has mild paresthesias at the tips of her toes but no localized numbness/tingling.          ROS: All pertinent positive symptoms are included in the history of present illness.    All other systems have been reviewed and are negative and noncontributory to this patient's current ailments.    Objective     There were no vitals filed for this visit.    Physical Exam  General/Constitutional: No apparent distress. Well-nourished and well developed.  Head: Normocephalic, Atraumatic.   Eyes: EOMI.  Vascular: No edema, swelling or tenderness, except as noted in detailed  exam.  Respiratory: Non-labored breathing.  Integumentary: No impressive skin lesions present, except as noted in detailed exam.  Neurological: Alert and oriented.  Psychological:  Normal mood and affect.  Musculoskeletal: Normal, except as noted in detailed exam.  Musculoskeletal: Left foot-there is no deformity or asymmetry when compared to the opposite side. There is tenderness to palpation at the base of the 1st and 2nd MT as well as medial cuneiform. She has FROM and strength is intact. She has pes planus of bilateral, left greater than right, foot . The remainder of the foot and ankle exam is unremarkable. He has an antalgic gait secondary to pain.       Patient ID: Priyanka Leblanc is a 79 y.o. female.    S Inj/Asp (Left second TMT joint) on 11/25/2024 2:52 PM  Details: 25 G needle, ultrasound-guided medial approach  Medications: 40 mg triamcinolone acetonide 40 mg/mL; 0.5 mL lidocaine 10 mg/mL (1 %)  Outcome: tolerated well, no immediate complications  Procedure, treatment alternatives, risks and benefits explained, specific risks discussed. Consent was given by the patient. Immediately prior to procedure a time out was called to verify the correct patient, procedure, equipment, support staff and site/side marked as required. Patient was prepped and draped in the usual sterile fashion.           Assessment/Plan   Problem List Items Addressed This Visit    None  Visit Diagnoses       Arthritis of left midfoot        Relevant Orders    Point of Care Ultrasound (Completed)          Considering that she has done well with previous injection, I feel that a repeat injection is warranted.  Discussed risks versus benefits of having injection performed. Patient has elected to proceed with procedure. Please refer to procedure note above. Discussed with patient to limit weightbearing activities over the next 24-48 hours, they can then progress to full activities as tolerated. Discussed with patient to avoid water  submersion over the next 2 days. Discussed with patient to call me immediately if they develop worsening pain, rash, erythema, or fevers. Patient should follow-up as needed if pain persists or worsens.  In the meantime, I did discuss with patient that she would likely benefit from arch supports.  She will obtain these as well.    Paulie Olsen,   Sports Medicine  [facility], [facility] [place]    ** Please excuse any errors in grammar or translation related to this dictation. Voice recognition software was utilized to prepare this document. **

## 2024-12-16 ENCOUNTER — APPOINTMENT (OUTPATIENT)
Dept: ORTHOPEDIC SURGERY | Facility: HOSPITAL | Age: 79
End: 2024-12-16
Payer: MEDICARE

## 2024-12-19 DIAGNOSIS — H90.3 ASYMMETRIC SNHL (SENSORINEURAL HEARING LOSS): Primary | ICD-10-CM

## 2025-01-10 ENCOUNTER — APPOINTMENT (OUTPATIENT)
Dept: RADIOLOGY | Facility: CLINIC | Age: 80
End: 2025-01-10
Payer: MEDICARE

## 2025-01-10 ENCOUNTER — HOSPITAL ENCOUNTER (OUTPATIENT)
Dept: RADIOLOGY | Facility: CLINIC | Age: 80
Discharge: HOME | End: 2025-01-10
Payer: MEDICARE

## 2025-01-10 DIAGNOSIS — H90.3 ASYMMETRIC SNHL (SENSORINEURAL HEARING LOSS): ICD-10-CM

## 2025-01-10 PROCEDURE — 2550000001 HC RX 255 CONTRASTS: Performed by: OTOLARYNGOLOGY

## 2025-01-10 PROCEDURE — A9575 INJ GADOTERATE MEGLUMI 0.1ML: HCPCS | Performed by: OTOLARYNGOLOGY

## 2025-01-10 PROCEDURE — 70553 MRI BRAIN STEM W/O & W/DYE: CPT

## 2025-01-10 RX ORDER — GADOTERATE MEGLUMINE 376.9 MG/ML
13 INJECTION INTRAVENOUS
Status: COMPLETED | OUTPATIENT
Start: 2025-01-10 | End: 2025-01-10

## 2025-01-10 RX ADMIN — GADOTERATE MEGLUMINE 13 ML: 376.9 INJECTION INTRAVENOUS at 15:13

## 2025-04-17 ENCOUNTER — TRANSCRIBE ORDERS (OUTPATIENT)
Dept: ORTHOPEDIC SURGERY | Facility: HOSPITAL | Age: 80
End: 2025-04-17
Payer: MEDICARE

## 2025-04-17 DIAGNOSIS — M54.50 LOW BACK PAIN, UNSPECIFIED BACK PAIN LATERALITY, UNSPECIFIED CHRONICITY, UNSPECIFIED WHETHER SCIATICA PRESENT: ICD-10-CM

## 2025-04-21 ENCOUNTER — HOSPITAL ENCOUNTER (OUTPATIENT)
Dept: RADIOLOGY | Facility: CLINIC | Age: 80
Discharge: HOME | End: 2025-04-21
Payer: MEDICARE

## 2025-04-21 ENCOUNTER — APPOINTMENT (OUTPATIENT)
Dept: ORTHOPEDIC SURGERY | Facility: CLINIC | Age: 80
End: 2025-04-21
Payer: MEDICARE

## 2025-04-21 ENCOUNTER — APPOINTMENT (OUTPATIENT)
Facility: CLINIC | Age: 80
End: 2025-04-21
Payer: MEDICARE

## 2025-04-21 DIAGNOSIS — M54.16 LUMBAR RADICULOPATHY: Primary | ICD-10-CM

## 2025-04-21 DIAGNOSIS — M47.816 LUMBAR SPONDYLOSIS: ICD-10-CM

## 2025-04-21 DIAGNOSIS — M51.361 DEGENERATION OF INTERVERTEBRAL DISC OF LUMBAR REGION WITH LOWER EXTREMITY PAIN: ICD-10-CM

## 2025-04-21 DIAGNOSIS — M54.50 LOW BACK PAIN, UNSPECIFIED BACK PAIN LATERALITY, UNSPECIFIED CHRONICITY, UNSPECIFIED WHETHER SCIATICA PRESENT: ICD-10-CM

## 2025-04-21 DIAGNOSIS — M41.9 SCOLIOSIS OF LUMBAR SPINE, UNSPECIFIED SCOLIOSIS TYPE: ICD-10-CM

## 2025-04-21 PROCEDURE — 99204 OFFICE O/P NEW MOD 45 MIN: CPT

## 2025-04-21 PROCEDURE — G2211 COMPLEX E/M VISIT ADD ON: HCPCS

## 2025-04-21 PROCEDURE — 99214 OFFICE O/P EST MOD 30 MIN: CPT

## 2025-04-21 PROCEDURE — 72110 X-RAY EXAM L-2 SPINE 4/>VWS: CPT | Performed by: RADIOLOGY

## 2025-04-21 PROCEDURE — 72110 X-RAY EXAM L-2 SPINE 4/>VWS: CPT

## 2025-04-21 RX ORDER — GABAPENTIN 300 MG/1
300 CAPSULE ORAL 3 TIMES DAILY
Qty: 90 CAPSULE | Refills: 2 | Status: SHIPPED | OUTPATIENT
Start: 2025-04-21 | End: 2025-07-20

## 2025-04-21 NOTE — PROGRESS NOTES
HPI:  Priyanka Lbelanc is an 79-year-old female who presents today with a 2-week history of left leg numbness and tingling in the anterior shin, side of the calf, and lateral foot.  It is somewhat constant.  Denies radiating pain.  Has been using Salonpas and Voltaren gel.  States that it is hard to sit still.  Does feel like her leg is somewhat weak compared to the right.  No symptoms in the right leg.  Also states she has coming going low back pain.  No other questions or concerns at time of visit.    ROS:  Reviewed on EMR and patient intake sheet.    PMH/SH:  Reviewed on EMR and patient intake sheet.    Exam:  MSK: 4/5 strength of left lower extremity, 5/5 strength of right lower extremity.  Negative straight leg raise bilaterally.  General: No acute distress. Awake and conversant.  Eyes: Normal conjunctiva, anicteric. Round symmetric pupils.  ENT: Hearing grossly intact. No nasal discharge.  Neck: Neck is supple. No masses or thyromegaly.  Respiratory: Respirations are non-labored. No wheezing.  Skin: Warm. No rashes or ulcers.  Psych: Alert and oriented. Cooperative, appropriate mood and affect, normal judgement.  CV: No lower extremity edema.  Neuro: Sensation and CN II-XII grossly normal.    Radiology:     X-rays personally reviewed and demonstrate levocurvature of lumbar spine with the apex at L2/3.  Lateral listhesis L3 on L4.  Loss of lumbar lordosis with severe degenerative changes throughout.  No instability on flexion-extension films.    Diagnosis:    Lumbar spondylosis   Degenerative disc disease, lumbar  Levocurvature, lumbar spine      Assessment and Plan:  Patient was seen today and evaluated for lumbar radicular symptoms on the left side that started randomly 2 weeks ago.  At this time, we discussed ambulating and exercising as tolerated, trialing gabapentin 300 mg 3 times daily, and a referral to physical therapy.  If her symptoms persist, I recommend the patient follow-up.  At that point, we will  consider lumbar MRI or injections.  Patient feels her questions were answered today.  Patient agrees to the plan above.    **This note was dictated using speech recognition software and was not corrected for spelling or grammatical errors**    John Sarabia PA-C  Department of Orthopaedic Surgery  9:48 AM  04/21/25    [id number] [place] [address]    Voicemail: [phone]   Appts: [phone]  Fax: [phone]

## 2025-04-25 ENCOUNTER — HOSPITAL ENCOUNTER (OUTPATIENT)
Dept: RADIOLOGY | Facility: CLINIC | Age: 80
Discharge: HOME | End: 2025-04-25
Payer: MEDICARE

## 2025-04-25 DIAGNOSIS — Z12.31 ENCOUNTER FOR SCREENING MAMMOGRAM FOR MALIGNANT NEOPLASM OF BREAST: ICD-10-CM

## 2025-04-25 PROCEDURE — 77067 SCR MAMMO BI INCL CAD: CPT

## 2025-05-21 ENCOUNTER — APPOINTMENT (OUTPATIENT)
Dept: PHYSICAL THERAPY | Facility: CLINIC | Age: 80
End: 2025-05-21
Payer: MEDICARE

## 2025-06-16 ENCOUNTER — OFFICE VISIT (OUTPATIENT)
Dept: ORTHOPEDIC SURGERY | Facility: HOSPITAL | Age: 80
End: 2025-06-16
Payer: MEDICARE

## 2025-06-16 ENCOUNTER — HOSPITAL ENCOUNTER (OUTPATIENT)
Dept: RADIOLOGY | Facility: EXTERNAL LOCATION | Age: 80
Discharge: HOME | End: 2025-06-16

## 2025-06-16 DIAGNOSIS — M19.072 ARTHRITIS OF LEFT MIDFOOT: Primary | ICD-10-CM

## 2025-06-16 PROCEDURE — 1125F AMNT PAIN NOTED PAIN PRSNT: CPT | Performed by: EMERGENCY MEDICINE

## 2025-06-16 PROCEDURE — 2500000004 HC RX 250 GENERAL PHARMACY W/ HCPCS (ALT 636 FOR OP/ED): Performed by: EMERGENCY MEDICINE

## 2025-06-16 PROCEDURE — 20604 DRAIN/INJ JOINT/BURSA W/US: CPT | Mod: LT | Performed by: EMERGENCY MEDICINE

## 2025-06-16 PROCEDURE — 1159F MED LIST DOCD IN RCRD: CPT | Performed by: EMERGENCY MEDICINE

## 2025-06-16 PROCEDURE — 99213 OFFICE O/P EST LOW 20 MIN: CPT | Performed by: EMERGENCY MEDICINE

## 2025-06-16 PROCEDURE — 99213 OFFICE O/P EST LOW 20 MIN: CPT | Mod: 25 | Performed by: EMERGENCY MEDICINE

## 2025-06-16 RX ORDER — TRIAMCINOLONE ACETONIDE 40 MG/ML
40 INJECTION, SUSPENSION INTRA-ARTICULAR; INTRAMUSCULAR
Status: COMPLETED | OUTPATIENT
Start: 2025-06-16 | End: 2025-06-16

## 2025-06-16 RX ORDER — LIDOCAINE HYDROCHLORIDE 10 MG/ML
1 INJECTION, SOLUTION INFILTRATION; PERINEURAL
Status: COMPLETED | OUTPATIENT
Start: 2025-06-16 | End: 2025-06-16

## 2025-06-16 RX ADMIN — LIDOCAINE HYDROCHLORIDE 1 ML: 10 INJECTION, SOLUTION INFILTRATION; PERINEURAL at 09:04

## 2025-06-16 RX ADMIN — TRIAMCINOLONE ACETONIDE 40 MG: 200 INJECTION, SUSPENSION INTRA-ARTICULAR; INTRAMUSCULAR at 09:04

## 2025-06-16 ASSESSMENT — PAIN SCALES - GENERAL: PAINLEVEL_OUTOF10: 8

## 2025-06-16 ASSESSMENT — PAIN - FUNCTIONAL ASSESSMENT: PAIN_FUNCTIONAL_ASSESSMENT: 0-10

## 2025-06-16 NOTE — PROGRESS NOTES
Subjective   Patient ID: Priyanka Leblanc is a 79 y.o. female who presents for Follow-up of the Left Foot.  History of Present Illness  The patient returns today for left foot pain. She has known left midfoot degenerative joint disease and has previously received corticosteroid injections in her second tarsometatarsal joint, which have been effective. Her last injection provided relief until recently. She is here today for a repeat injection and reports no additional complaints.    All other systems have been reviewed and are negative for complaint.      Objective     There were no vitals taken for this visit.     Physical Exam  - Musculoskeletal:    - Left Foot: Pes planus, swelling along the second TMT joint  - Skin:    - No rash, erythema, or lesions on the left foot        Patient ID: Priyanka Leblanc is a 79 y.o. female.    S Inj/Asp: L subtalar (Second TMT joint) on 6/16/2025 9:04 AM  Details: 25 G needle, ultrasound-guided medial approach  Medications: 40 mg triamcinolone acetonide 40 mg/mL; 1 mL lidocaine 10 mg/mL (1 %)  Outcome: tolerated well, no immediate complications  Procedure, treatment alternatives, risks and benefits explained, specific risks discussed. Consent was given by the patient. Immediately prior to procedure a time out was called to verify the correct patient, procedure, equipment, support staff and site/side marked as required. Patient was prepped and draped in the usual sterile fashion.         1. Arthritis of left midfoot  Point of Care Ultrasound          Assessment & Plan  1. Left midfoot degenerative joint disease  - Presents with left midfoot DJD and has previously responded well to corticosteroid injections in the second TMT joint  - A repeat corticosteroid injection was administered today  - Advised not to soak foot in water for 2 days, showers are permissible, but avoid hot tubs or baths  - Advised to take it relatively easy but not to completely rest the foot  - If symptoms  persist, inform the clinic    PROCEDURE    Repeat corticosteroid injection of the left second TMT joint was performed today.    Romeo Olsen,          This medical note was created with the assistance of artificial intelligence (AI) for documentation purposes. The content has been reviewed and confirmed by the healthcare provider for accuracy and completeness. Patient consented to the use of audio recording and use of AI during their visit.